# Patient Record
Sex: MALE | Race: BLACK OR AFRICAN AMERICAN | URBAN - METROPOLITAN AREA
[De-identification: names, ages, dates, MRNs, and addresses within clinical notes are randomized per-mention and may not be internally consistent; named-entity substitution may affect disease eponyms.]

---

## 2018-01-27 ENCOUNTER — APPOINTMENT (OUTPATIENT)
Dept: GENERAL RADIOLOGY | Age: 23
DRG: 885 | End: 2018-01-27

## 2018-01-27 ENCOUNTER — HOSPITAL ENCOUNTER (INPATIENT)
Age: 23
LOS: 5 days | Discharge: HOME | DRG: 885 | End: 2018-02-01
Attending: EMERGENCY MEDICINE | Admitting: PSYCHIATRY & NEUROLOGY
Payer: MEDICAID

## 2018-01-27 DIAGNOSIS — F14.10 COCAINE ABUSE (HCC): ICD-10-CM

## 2018-01-27 DIAGNOSIS — T39.1X2A TYLENOL OVERDOSE, INTENTIONAL SELF-HARM, INITIAL ENCOUNTER (HCC): ICD-10-CM

## 2018-01-27 DIAGNOSIS — F32.A DEPRESSION, UNSPECIFIED DEPRESSION TYPE: Primary | ICD-10-CM

## 2018-01-27 DIAGNOSIS — F12.10 MARIJUANA ABUSE: ICD-10-CM

## 2018-01-27 LAB
ACETAMINOPHEN LEVEL: 42 UG/ML (ref 10–30)
ACETAMINOPHEN LEVEL: 63 UG/ML (ref 10–30)
ALBUMIN SERPL-MCNC: 4.1 G/DL (ref 3.9–4.9)
ALBUMIN SERPL-MCNC: 4.8 G/DL (ref 3.9–4.9)
ALP BLD-CCNC: 52 U/L (ref 35–104)
ALP BLD-CCNC: 59 U/L (ref 35–104)
ALT SERPL-CCNC: 10 U/L (ref 0–41)
ALT SERPL-CCNC: 12 U/L (ref 0–41)
AMPHETAMINE SCREEN, URINE: ABNORMAL
ANION GAP SERPL CALCULATED.3IONS-SCNC: 13 MEQ/L (ref 7–13)
ANION GAP SERPL CALCULATED.3IONS-SCNC: 18 MEQ/L (ref 7–13)
AST SERPL-CCNC: 22 U/L (ref 0–40)
AST SERPL-CCNC: 27 U/L (ref 0–40)
BARBITURATE SCREEN URINE: ABNORMAL
BASOPHILS ABSOLUTE: 0.1 K/UL (ref 0–0.2)
BASOPHILS RELATIVE PERCENT: 0.6 %
BENZODIAZEPINE SCREEN, URINE: ABNORMAL
BILIRUB SERPL-MCNC: 0.1 MG/DL (ref 0–1.2)
BILIRUB SERPL-MCNC: 0.2 MG/DL (ref 0–1.2)
BUN BLDV-MCNC: 17 MG/DL (ref 6–20)
BUN BLDV-MCNC: 19 MG/DL (ref 6–20)
CALCIUM SERPL-MCNC: 8.2 MG/DL (ref 8.6–10.2)
CALCIUM SERPL-MCNC: 9 MG/DL (ref 8.6–10.2)
CANNABINOID SCREEN URINE: POSITIVE
CHLORIDE BLD-SCNC: 104 MEQ/L (ref 98–107)
CHLORIDE BLD-SCNC: 104 MEQ/L (ref 98–107)
CK MB: 7.4 NG/ML (ref 0–6.7)
CK MB: 8.5 NG/ML (ref 0–6.7)
CO2: 19 MEQ/L (ref 22–29)
CO2: 21 MEQ/L (ref 22–29)
COCAINE METABOLITE SCREEN URINE: POSITIVE
CREAT SERPL-MCNC: 0.76 MG/DL (ref 0.7–1.2)
CREAT SERPL-MCNC: 0.83 MG/DL (ref 0.7–1.2)
CREATINE KINASE-MB INDEX: 1.3 % (ref 0–3.5)
CREATINE KINASE-MB INDEX: 1.5 % (ref 0–3.5)
EKG ATRIAL RATE: 79 BPM
EKG P AXIS: 42 DEGREES
EKG P-R INTERVAL: 152 MS
EKG Q-T INTERVAL: 372 MS
EKG QRS DURATION: 98 MS
EKG QTC CALCULATION (BAZETT): 426 MS
EKG R AXIS: 85 DEGREES
EKG T AXIS: 54 DEGREES
EKG VENTRICULAR RATE: 79 BPM
EOSINOPHILS ABSOLUTE: 0.1 K/UL (ref 0–0.7)
EOSINOPHILS RELATIVE PERCENT: 0.9 %
ETHANOL PERCENT: 0.1 G/DL
ETHANOL PERCENT: 0.17 G/DL
ETHANOL: 114 MG/DL (ref 0–0.08)
ETHANOL: 189 MG/DL (ref 0–0.08)
GFR AFRICAN AMERICAN: >60
GFR AFRICAN AMERICAN: >60
GFR NON-AFRICAN AMERICAN: >60
GFR NON-AFRICAN AMERICAN: >60
GLOBULIN: 2.1 G/DL (ref 2.3–3.5)
GLOBULIN: 2.7 G/DL (ref 2.3–3.5)
GLUCOSE BLD-MCNC: 81 MG/DL (ref 74–109)
GLUCOSE BLD-MCNC: 83 MG/DL (ref 74–109)
HCT VFR BLD CALC: 47.1 % (ref 42–52)
HEMOGLOBIN: 15.4 G/DL (ref 14–18)
LIPASE: 35 U/L (ref 13–60)
LYMPHOCYTES ABSOLUTE: 3 K/UL (ref 1–4.8)
LYMPHOCYTES RELATIVE PERCENT: 29.2 %
Lab: ABNORMAL
MCH RBC QN AUTO: 28.9 PG (ref 27–31.3)
MCHC RBC AUTO-ENTMCNC: 32.7 % (ref 33–37)
MCV RBC AUTO: 88.1 FL (ref 80–100)
MONOCYTES ABSOLUTE: 0.7 K/UL (ref 0.2–0.8)
MONOCYTES RELATIVE PERCENT: 6.5 %
NEUTROPHILS ABSOLUTE: 6.4 K/UL (ref 1.4–6.5)
NEUTROPHILS RELATIVE PERCENT: 62.8 %
OPIATE SCREEN URINE: ABNORMAL
PDW BLD-RTO: 13.5 % (ref 11.5–14.5)
PHENCYCLIDINE SCREEN URINE: ABNORMAL
PLATELET # BLD: 245 K/UL (ref 130–400)
POTASSIUM SERPL-SCNC: 3.5 MEQ/L (ref 3.5–5.1)
POTASSIUM SERPL-SCNC: 3.8 MEQ/L (ref 3.5–5.1)
RBC # BLD: 5.34 M/UL (ref 4.7–6.1)
SALICYLATE, SERUM: <0.3 MG/DL (ref 15–30)
SALICYLATE, SERUM: <0.3 MG/DL (ref 15–30)
SODIUM BLD-SCNC: 138 MEQ/L (ref 132–144)
SODIUM BLD-SCNC: 141 MEQ/L (ref 132–144)
TOTAL CK: 560 U/L (ref 0–190)
TOTAL CK: 565 U/L (ref 0–190)
TOTAL PROTEIN: 6.2 G/DL (ref 6.4–8.1)
TOTAL PROTEIN: 7.5 G/DL (ref 6.4–8.1)
TSH SERPL DL<=0.05 MIU/L-ACNC: 2.7 UIU/ML (ref 0.27–4.2)
WBC # BLD: 10.1 K/UL (ref 4.8–10.8)

## 2018-01-27 PROCEDURE — 80307 DRUG TEST PRSMV CHEM ANLYZR: CPT

## 2018-01-27 PROCEDURE — 82553 CREATINE MB FRACTION: CPT

## 2018-01-27 PROCEDURE — 6370000000 HC RX 637 (ALT 250 FOR IP): Performed by: PSYCHIATRY & NEUROLOGY

## 2018-01-27 PROCEDURE — 73130 X-RAY EXAM OF HAND: CPT

## 2018-01-27 PROCEDURE — 6360000002 HC RX W HCPCS: Performed by: EMERGENCY MEDICINE

## 2018-01-27 PROCEDURE — 99285 EMERGENCY DEPT VISIT HI MDM: CPT

## 2018-01-27 PROCEDURE — 83690 ASSAY OF LIPASE: CPT

## 2018-01-27 PROCEDURE — 96375 TX/PRO/DX INJ NEW DRUG ADDON: CPT

## 2018-01-27 PROCEDURE — 82550 ASSAY OF CK (CPK): CPT

## 2018-01-27 PROCEDURE — 80053 COMPREHEN METABOLIC PANEL: CPT

## 2018-01-27 PROCEDURE — S0028 INJECTION, FAMOTIDINE, 20 MG: HCPCS | Performed by: EMERGENCY MEDICINE

## 2018-01-27 PROCEDURE — 2500000003 HC RX 250 WO HCPCS: Performed by: EMERGENCY MEDICINE

## 2018-01-27 PROCEDURE — 85025 COMPLETE CBC W/AUTO DIFF WBC: CPT

## 2018-01-27 PROCEDURE — 2580000003 HC RX 258: Performed by: EMERGENCY MEDICINE

## 2018-01-27 PROCEDURE — 1240000000 HC EMOTIONAL WELLNESS R&B

## 2018-01-27 PROCEDURE — G0480 DRUG TEST DEF 1-7 CLASSES: HCPCS

## 2018-01-27 PROCEDURE — 84443 ASSAY THYROID STIM HORMONE: CPT

## 2018-01-27 PROCEDURE — 93005 ELECTROCARDIOGRAM TRACING: CPT

## 2018-01-27 PROCEDURE — 96374 THER/PROPH/DIAG INJ IV PUSH: CPT

## 2018-01-27 PROCEDURE — 36415 COLL VENOUS BLD VENIPUNCTURE: CPT

## 2018-01-27 RX ORDER — NICOTINE 21 MG/24HR
1 PATCH, TRANSDERMAL 24 HOURS TRANSDERMAL DAILY
Status: DISCONTINUED | OUTPATIENT
Start: 2018-01-27 | End: 2018-02-01 | Stop reason: HOSPADM

## 2018-01-27 RX ORDER — ONDANSETRON 2 MG/ML
4 INJECTION INTRAMUSCULAR; INTRAVENOUS ONCE
Status: COMPLETED | OUTPATIENT
Start: 2018-01-27 | End: 2018-01-27

## 2018-01-27 RX ORDER — HALOPERIDOL 5 MG
5 TABLET ORAL EVERY 6 HOURS PRN
Status: DISCONTINUED | OUTPATIENT
Start: 2018-01-27 | End: 2018-02-01 | Stop reason: HOSPADM

## 2018-01-27 RX ORDER — HYDROXYZINE PAMOATE 50 MG/1
50 CAPSULE ORAL EVERY 6 HOURS PRN
Status: DISCONTINUED | OUTPATIENT
Start: 2018-01-27 | End: 2018-02-01 | Stop reason: HOSPADM

## 2018-01-27 RX ORDER — HALOPERIDOL 5 MG/ML
5 INJECTION INTRAMUSCULAR EVERY 6 HOURS PRN
Status: DISCONTINUED | OUTPATIENT
Start: 2018-01-27 | End: 2018-02-01 | Stop reason: HOSPADM

## 2018-01-27 RX ORDER — 0.9 % SODIUM CHLORIDE 0.9 %
1000 INTRAVENOUS SOLUTION INTRAVENOUS ONCE
Status: COMPLETED | OUTPATIENT
Start: 2018-01-27 | End: 2018-01-27

## 2018-01-27 RX ORDER — HYDROXYZINE HYDROCHLORIDE 50 MG/ML
50 INJECTION, SOLUTION INTRAMUSCULAR EVERY 6 HOURS PRN
Status: DISCONTINUED | OUTPATIENT
Start: 2018-01-27 | End: 2018-02-01 | Stop reason: HOSPADM

## 2018-01-27 RX ORDER — ACETAMINOPHEN 325 MG/1
650 TABLET ORAL EVERY 4 HOURS PRN
Status: DISCONTINUED | OUTPATIENT
Start: 2018-01-27 | End: 2018-01-28

## 2018-01-27 RX ADMIN — FAMOTIDINE 20 MG: 10 INJECTION, SOLUTION INTRAVENOUS at 04:41

## 2018-01-27 RX ADMIN — SODIUM CHLORIDE 1000 ML: 9 INJECTION, SOLUTION INTRAVENOUS at 04:39

## 2018-01-27 RX ADMIN — ACETAMINOPHEN 650 MG: 325 TABLET, FILM COATED ORAL at 20:55

## 2018-01-27 RX ADMIN — ONDANSETRON 4 MG: 2 INJECTION INTRAMUSCULAR; INTRAVENOUS at 04:39

## 2018-01-27 ASSESSMENT — ENCOUNTER SYMPTOMS
COUGH: 0
SHORTNESS OF BREATH: 0
VOMITING: 1

## 2018-01-27 ASSESSMENT — PATIENT HEALTH QUESTIONNAIRE - PHQ9
SUM OF ALL RESPONSES TO PHQ QUESTIONS 1-9: 8
SUM OF ALL RESPONSES TO PHQ QUESTIONS 1-9: 15

## 2018-01-27 ASSESSMENT — SLEEP AND FATIGUE QUESTIONNAIRES
RESTFUL SLEEP: YES
AVERAGE NUMBER OF SLEEP HOURS: 7
DIFFICULTY STAYING ASLEEP: YES
DO YOU USE A SLEEP AID: YES
DO YOU HAVE DIFFICULTY SLEEPING: NO
SLEEP PATTERN: DISTURBED/INTERRUPTED SLEEP
DIFFICULTY FALLING ASLEEP: NO
DIFFICULTY ARISING: NO

## 2018-01-27 ASSESSMENT — PAIN SCALES - GENERAL: PAINLEVEL_OUTOF10: 7

## 2018-01-27 NOTE — ED TRIAGE NOTES
Pt arrived via [facility] with [employer] escort for Suicide attempt. Pt told girlfriend tonight that he was going to go out drinking so he could try to kill himself tonight. Pt went to girlfriends house after going to bars and took unknown amount of tylenol and motrin. Girlfriend called [employer]. Pt is pink slipped per [employer]. Pt states he was trying to hurt himself. Pt arrived vomiting. Pt A&Ox4.

## 2018-01-27 NOTE — ED NOTES
Provisional Diagnosis:   Depressive D/O Unspecified   Suicide Attempt    Psychosocial and Contextual Factors:   Pt lives with his girlfriend for over three years, he has two children with them, they are a boy 1 and a girl 1years old. C-SSRS Summary:      Patient: Pt had an over dose of tylenols before arrival to the ER with an increased tylenol level on arrival to the ER  He was intoxicated. Family: Unknown  Agency: None currently  [place] [employer] he was seen at [facility]    Present Suicidal Behavior:      Verbal: Current suicidal thoughts with no current plan    Attempt: Pt took an over dose of pills at home due to stress per pt arguments with his girlfriend    Past Suicidal Behavior:    Verbal: Per pt he had thoughts to hurt himself about a month ago and did not hurt himself    Attempt: None      Self-Injurious/Self-Mutilation: None no H/O self mutilation per pt  Trauma Identified:        Protective Factors:  Pt has a residence to return to and a girlfriend and children in the community he resides with      Risk Factors:  Pt had thoughts less than a month ago and an over dose of tylenols at home before his arrival to the ER. Pt was intoxicated when he took the pills but still has thoughts with no plan currently      Clinical Summary:  Pt came into the ER from home after his girlfriend who he resides with called 911 due to he had taken tylenols to kill himself. He was intoxicated his alcohol level was 0.166 on arrival to the ER and he was positive for THC and Cocaine. He relates his wanting to kill himself was recent arguments with his girlfriend and he has two children. He is layed off from his job and another reason for his anxiety, depression. Pt states he smokes Marijuana a couple times a week and Cocaine usage was  About two weeks ago or longer. Pt has no H/O suicide attempts but for about a month had he has been thinking of killing himself, he was drinking and took tylenols to kill himself.   His acetaminophen level was initially 63 and down currently 42 at 0835. Pt was medically cleared before his arrival to [facility]. He has a flat, sad affect. He has no H/O assault or domestic violence but in residential briefly x 2 one DUI in 2017 and one two weeks ago where he has to pay fines and if not paid possibly would get residential time, no other residential time for anything else per pt. He saw the [facility] when in high school and no other agencies since then, he is on no medications currently. Pt relates stress to his arguments with his girlfriend and being laid off from his job. Level of Care Disposition:  On call psychiatrist will be called after Debbie Washburn assesses the pt for a disposition. [facility] would need to david a bed for the pt on [facility].    Insurance Precertification Authorization:  Pt is indigent with no insurance and [facility] will assess for the pt's disposition.        Mansi Bailey RN  01/27/18 1200

## 2018-01-27 NOTE — ED NOTES
Pt walked to [facility] bed 1 after medically cleared, has [address] and pant on. . Pt agreeable and cooperative. Verbally report given to [facility] staff.      Claudia Anguiano RN  01/27/18 5758

## 2018-01-27 NOTE — ED NOTES
Reviewed with Dr. Isabela Dumont pt's arrival to ER after drinking he took a bottle of tylenols unknown how many and was vomiting on arrival his initial tylenol level was 63 now in low 40's. He drinks alcohol 2-3 x a week is stressed over being layed off, recent illness of his mother and mother-in-law, and a recent 2nd DUI in less than a year with last having to pay fines and if he does not pay then he will do [facility] time. Currently on no medications no W/D S/S when he stops drinking, reviewed with the doctor his DX and his indigent status with [facility] paying for [facility] stay an indigent bed.   Per Dr. Isabela Dumont admit pt to [facility] Depressive D/O Unspecified on indigent status no home medications and Haldol and Vistaril as PRN's, no other medications     Teena Hawkins RN  01/27/18 4204

## 2018-01-27 NOTE — ED NOTES
Per Jovani Sparks at [facility] she is going to consult with her supervisor and let [facility]-ER aware of what his disposition will be.   [facility] supervisor Jessee Sifuentes is consulting with Jovani Sparks for pt's disposition     Ritchie Doss RN  01/27/18 1366

## 2018-01-27 NOTE — ED PROVIDER NOTES
[facility]  eMERGENCY dEPARTMENT eNCOUnter      Pt Name: Cristiana Zuñiga  MRN: [id number]  Armshellengftara 1995  Date of evaluation: 1/27/2018  Provider: Nava Irwin DO    CHIEF COMPLAINT       Chief Complaint   Patient presents with    Suicide Attempt         HISTORY OF PRESENT ILLNESS   (Location/Symptom, Timing/Onset, Context/Setting, Quality, Duration, Modifying Factors, Severity)  Note limiting factors. Cristiana Zuñiga is a 20 y.o. male who presents to the emergency department Via police. They have filled out a pink sheet as this patient is suicidal.  The patient does not give much history, he states that he took anything he could get his hands on tonight, please say ibuprofen and Tylenol were strewn about the room. Patient does admit to drinking alcohol. He is currently vomiting. He denies abdominal pain. He states he did not use any drugs. Nursing Notes were reviewed. REVIEW OF SYSTEMS    (2-9 systems for level 4, 10 or more for level 5)     Review of Systems   Constitutional: Negative for fever. Respiratory: Negative for cough and shortness of breath. Cardiovascular: Negative for leg swelling. Gastrointestinal: Positive for vomiting. Genitourinary: Negative for hematuria. Musculoskeletal: Negative for joint swelling. Skin: Negative for rash. Neurological: Negative for weakness. Psychiatric/Behavioral: Positive for suicidal ideas. All other systems reviewed and are negative. Except as noted above the remainder of the review of systems was reviewed and negative. PAST MEDICAL HISTORY     Past Medical History:   Diagnosis Date    Depression          SURGICAL HISTORY     History reviewed. No pertinent surgical history. CURRENT MEDICATIONS       There are no discharge medications for this patient. ALLERGIES     Review of patient's allergies indicates no known allergies. FAMILY HISTORY     History reviewed. No pertinent family history.        SOCIAL HISTORY Social History     Social History    Marital status: Single     Spouse name: N/A    Number of children: N/A    Years of education: N/A     Social History Main Topics    Smoking status: Current Every Day Smoker     Packs/day: 1.00     Years: 5.00    Smokeless tobacco: Never Used    Alcohol use Yes      Comment: 2-3 x a week    Drug use: Yes     Types: Marijuana    Sexual activity: Not Asked     Other Topics Concern    None     Social History Narrative    None       SCREENINGS   NIH Stroke Scale  NIH Stroke Scale Assessed: NoGlasgow Coma Scale  Eye Opening: Spontaneous  Best Verbal Response: Oriented  Best Motor Response: Obeys commands  Marcelo Coma Scale Score: 15        PHYSICAL EXAM    (up to 7 for level 4, 8 or more for level 5)     ED Triage Vitals   BP Temp Temp src Pulse Resp SpO2 Height Weight   -- -- -- -- -- -- -- --       Physical Exam   Constitutional: He appears well-developed and well-nourished. He appears distressed. Patient in mild distress secondary to current condition. Actively vomiting   HENT:   Head: Normocephalic and atraumatic. Mouth/Throat: Oropharynx is clear and moist.   Eyes: Conjunctivae are normal.   Pupils are equally round and reacting normally. Extraoccular muscles are grossly intact. Neck: Normal range of motion. No tracheal deviation present. Cardiovascular: Normal rate, regular rhythm, normal heart sounds and intact distal pulses. Exam reveals no friction rub. No murmur heard. Patient tachycardic   Pulmonary/Chest: Effort normal and breath sounds normal. No stridor. No respiratory distress. He has no wheezes. He has no rales. He exhibits no tenderness. Abdominal: Soft. He exhibits no distension and no mass. There is no tenderness. There is no rebound and no guarding. Musculoskeletal: Normal range of motion. He exhibits no edema or deformity. Neurological: He is alert. Answering questions appropriately. No gaze deficit. No gait abnormality.  Moving all extremities. Skin: Skin is warm and dry. Psychiatric: His affect is inappropriate. He expresses impulsivity. He expresses suicidal ideation. He is noncommunicative. Nursing note and vitals reviewed. EMERGENCY DEPARTMENT COURSE and DIFFERENTIAL DIAGNOSIS/MDM:   Vitals:    Vitals:    01/29/18 0955 01/29/18 0955 01/29/18 1544 01/29/18 1547   BP: (!) 138/98 (!) 125/93 (!) 133/90 127/87   Pulse: 79 98 81 115   Resp: 20      Temp: 98 °F (36.7 °C)  98 °F (36.7 °C)    TempSrc: Oral Oral     SpO2:  97% 99%    Weight:       Height:           Patient presents to the emergency department with the complaint described above. Vital signs show tachycardia. He is actively vomiting but otherwise no distress. Abdomen soft and nontender. I will do a full metabolic workup, he will get IV fluids, IV Pepcid and IV Zofran, I will be checking alcohol, Tylenol and salicylate levels in addition to urine drug screen. Is here on a pink slip he will need evaluation by behavioral health once medically cleared. Dr. Vincent Smoker assumed care of the patient. At [place] [address] Controlled consulted. The ER physician spoke with Verner Feast the pharmacist at [address] [place] [address],[address] control and she recommends repeat levels of salicylate, Tylenol, CMP and also to do an EKG.     DIAGNOSTIC RESULTS     LABS:  Labs Reviewed   CK - Abnormal; Notable for the following:        Result Value    Total  (*)     All other components within normal limits    Narrative:     CALL  [facility]  [facility] tel. [phone],  acetaminophen results called to and read back by dr Joana Nageotte, 01/27/2018 05:42,  by Aliyah Zhu - Abnormal; Notable for the following:     Acetaminophen Level 63 (*)     All other components within normal limits    Narrative:     [place] [facility]  [facility] tel. [phone],  acetaminophen results called to and read back by dr Joana Nageotte, 01/27/2018 05:42,  by Fadi Antoine - Abnormal; Notable for the following:     Cannabinoid Scrn, Ur POSITIVE (*) COCAINE METABOLITE SCREEN URINE POSITIVE (*)     All other components within normal limits   SALICYLATE LEVEL - Abnormal; Notable for the following:     Salicylate, Serum <7.9 (*)     All other components within normal limits    Narrative:     CALL  [facility]  ED tel. [phone],  acetaminophen results called to and read back by dr Lorenzo Terrazas, 01/27/2018 05:42,  by JASVIR   CBC WITH AUTO DIFFERENTIAL - Abnormal; Notable for the following:     MCHC 32.7 (*)     All other components within normal limits   COMPREHENSIVE METABOLIC PANEL - Abnormal; Notable for the following:     CO2 19 (*)     Anion Gap 18 (*)     All other components within normal limits    Narrative:     CALL  [facility]  ED tel. [phone],  acetaminophen results called to and read back by dr Lorenzo Terrazas, 01/27/2018 05:42,  by [address] - Abnormal; Notable for the following:     CK-MB 7.4 (*)     All other components within normal limits    Narrative:     CALL  [facility]  ED tel. [phone],  acetaminophen results called to and read back by dr Lorenzo Terrazas, 01/27/2018 05:42,  by Hernando Dai   ACETAMINOPHEN LEVEL - Abnormal; Notable for the following:     Acetaminophen Level 42 (*)     All other components within normal limits    Narrative:     CALL  [facility]  ED tel. [phone],  Acetaminophen results called to and read back by Audrey Santos, 01/27/2018  08:35, by EDISON   CK - Abnormal; Notable for the following:      Total  (*)     All other components within normal limits    Narrative:     CALL  [facility]  ED tel. [phone],  Acetaminophen results called to and read back by Audrey Santos, 01/27/2018  08:35, by [address] [place] [address] - Abnormal; Notable for the following:     CO2 21 (*)     Calcium 8.2 (*)     Total Protein 6.2 (*)     Globulin 2.1 (*)     All other components within normal limits    Narrative:     CALL  [facility]  ED tel. [phone],  Acetaminophen results called to and read back by Audrey Santos, 01/27/2018  08:35, by Rhodia Saint SALICYLATE LEVEL - Abnormal; Notable for the following:     Salicylate, Serum <3.0 (*)     All other components within normal limits    Narrative:     Paul Harper  [facility] tel. [phone],  Acetaminophen results called to and read back by Audrey Santos, 01/27/2018  08:35, by Elizabeth Buchanan   CKMB & RELATIVE PERCENT - Abnormal; Notable for the following:     CK-MB 8.5 (*)     All other components within normal limits    Narrative:     CALL  [facility]  [facility] tel. [phone],  Acetaminophen results called to and read back by Audrey Santos, 01/27/2018  08:35, by EDISON   RAPID INFLUENZA A/B ANTIGENS   ETHANOL   TSH WITHOUT REFLEX    Narrative:     Elainanighat Lam tel. [phone],  acetaminophen results called to and read back by dr Swati Moody, 01/27/2018 05:42,  by Cherylene Menghini    Narrative:     Nichole Lam tel. [phone],  acetaminophen results called to and read back by dr Swati Moody, 01/27/2018 05:42,  by [id number] [place] [address], BLOOD   HEMOGLOBIN AND HEMATOCRIT, BLOOD   HEMOGLOBIN AND HEMATOCRIT, BLOOD    Narrative:     Collection has been rescheduled by Anne Marie Muse at 1/29/18 10:09. Reason: q6   per   jose   POCT OCCULT BLOOD STOOL NON CA SCREEN       All other labs were within normal range or not returned as of this dictation. XR HAND RIGHT (MIN 3 VIEWS)   Final Result      NEGATIVE RIGHT HAND. ED Course as of Jan 30 0723   Sat Jan 27, 2018   0543 Acetaminophen level noted to be 63. Patient will need repeat Tylenol level in 2 hours. This will be at 7:45 AM. Pt likely safe based on [place]inda-Golden nomogram but he will require a repeat tylenol level as stated. Otherwise stable in ED Acetaminophen Level: (!!) 63 [TS]   0546 Patient also noted positive cannabinoid and cocaine metabolites, AST/ADL T are within normal limits currently.   Alcohol level elevated at 189  [TS]      ED Course User Index  [TS] Mary Hastings,        EKG: Normal sinus rhythm at 79 bpm, there is an RSR pattern in V1 and V2, normal axis, the QT interval is 372 ms, the QTC is 426 ms, no PVCs. Patient is medically cleared for psychiatric services. CONSULTS:  IP CONSULT TO HOSPITALIST    PROCEDURES:  Unless otherwise noted below, none     Procedures    FINAL IMPRESSION      1. Depression, unspecified depression type    2. Ingestion of nontoxic substance, intentional self-harm, initial encounter ([facility] [address]    3. Cocaine abuse    4. Marijuana abuse          DISPOSITION/PLAN   DISPOSITION Admitted 01/27/2018 02:19:43 PM      PATIENT REFERRED TO:  No follow-up provider specified. DISCHARGE MEDICATIONS:  There are no discharge medications for this patient.          (Please note that portions of this note were completed with a voice recognition program.  Efforts were made to edit the dictations but occasionally words are mis-transcribed.)    Raúl Mack DO (electronically signed)  Attending Emergency Physician         Raúl Mack DO  01/30/18 0725

## 2018-01-27 NOTE — ED NOTES
Pt awaken for updated assessment. Arouses to name and is cooperative, offered food and fluids took ice water but currently refusing food.      Godfrey Garcia RN  01/27/18 7925

## 2018-01-27 NOTE — ED NOTES
Pt's belongings were returned from security and pt left via a W/c for [facility] with [facility] staff and scurity on [facility] pink sheet continued by Dr. Maggy Estrada with April on report to her pt's EKG was completed     Griffin Allan RN  01/27/18 6280

## 2018-01-27 NOTE — ED NOTES
Called [facility] talked with April RN gave a report on pt. Reviewed reason here in ER, labs, medical history, DX:  And an indigent bed from [facility] reviewed all labs, his tylenol level recent stressors include: illness in his mother and mother-in-law, layed off from a job recently and DUI since 7/17 the last being two weeks ago where if he does not pay the fines may have to do [facility] time. Reviewed no H/O self mutilation, no psychosis, no delusions was seen by [place] while in high school saw a counselor for depression none since then. Reviewed drug H/O marijuana and alcohol 2-3 x a week but with 2 DUI in less than a year apart.   Room 387 bed 1131 No. [place], RN  01/27/18 [facility], RN  01/27/18 1012

## 2018-01-28 LAB
HCT VFR BLD CALC: 48.7 % (ref 42–52)
HEMOGLOBIN: 16.1 G/DL (ref 14–18)
RAPID INFLUENZA  B AGN: NEGATIVE
RAPID INFLUENZA A AGN: NEGATIVE

## 2018-01-28 PROCEDURE — 85014 HEMATOCRIT: CPT

## 2018-01-28 PROCEDURE — 36415 COLL VENOUS BLD VENIPUNCTURE: CPT

## 2018-01-28 PROCEDURE — 6370000000 HC RX 637 (ALT 250 FOR IP): Performed by: PSYCHIATRY & NEUROLOGY

## 2018-01-28 PROCEDURE — 85018 HEMOGLOBIN: CPT

## 2018-01-28 PROCEDURE — 86403 PARTICLE AGGLUT ANTBDY SCRN: CPT

## 2018-01-28 PROCEDURE — 1240000000 HC EMOTIONAL WELLNESS R&B

## 2018-01-28 PROCEDURE — 6360000002 HC RX W HCPCS: Performed by: INTERNAL MEDICINE

## 2018-01-28 RX ORDER — FOLIC ACID 1 MG/1
1 TABLET ORAL DAILY
Status: DISCONTINUED | OUTPATIENT
Start: 2018-01-28 | End: 2018-02-01 | Stop reason: HOSPADM

## 2018-01-28 RX ORDER — MULTIVITAMIN WITH FOLIC ACID 400 MCG
1 TABLET ORAL DAILY
Status: DISCONTINUED | OUTPATIENT
Start: 2018-01-28 | End: 2018-02-01 | Stop reason: HOSPADM

## 2018-01-28 RX ORDER — CHLORDIAZEPOXIDE HYDROCHLORIDE 10 MG/1
10 CAPSULE, GELATIN COATED ORAL EVERY 6 HOURS PRN
Status: DISCONTINUED | OUTPATIENT
Start: 2018-01-29 | End: 2018-01-29

## 2018-01-28 RX ORDER — ONDANSETRON 4 MG/1
4 TABLET, ORALLY DISINTEGRATING ORAL EVERY 8 HOURS PRN
Status: DISCONTINUED | OUTPATIENT
Start: 2018-01-28 | End: 2018-01-28

## 2018-01-28 RX ORDER — THIAMINE MONONITRATE (VIT B1) 100 MG
100 TABLET ORAL DAILY
Status: DISCONTINUED | OUTPATIENT
Start: 2018-01-28 | End: 2018-02-01 | Stop reason: HOSPADM

## 2018-01-28 RX ORDER — CHLORDIAZEPOXIDE HYDROCHLORIDE 5 MG/1
5 CAPSULE, GELATIN COATED ORAL EVERY 6 HOURS PRN
Status: DISCONTINUED | OUTPATIENT
Start: 2018-01-28 | End: 2018-01-28

## 2018-01-28 RX ORDER — ONDANSETRON 4 MG/1
4 TABLET, FILM COATED ORAL EVERY 8 HOURS PRN
Status: DISCONTINUED | OUTPATIENT
Start: 2018-01-28 | End: 2018-02-01 | Stop reason: HOSPADM

## 2018-01-28 RX ORDER — CHLORDIAZEPOXIDE HYDROCHLORIDE 5 MG/1
5 CAPSULE, GELATIN COATED ORAL EVERY 6 HOURS PRN
Status: DISCONTINUED | OUTPATIENT
Start: 2018-01-30 | End: 2018-01-29

## 2018-01-28 RX ORDER — CHLORDIAZEPOXIDE HYDROCHLORIDE 25 MG/1
25 CAPSULE, GELATIN COATED ORAL EVERY 6 HOURS PRN
Status: DISCONTINUED | OUTPATIENT
Start: 2018-01-28 | End: 2018-01-29

## 2018-01-28 RX ORDER — SERTRALINE HYDROCHLORIDE 25 MG/1
25 TABLET, FILM COATED ORAL DAILY
Status: DISCONTINUED | OUTPATIENT
Start: 2018-01-28 | End: 2018-01-29

## 2018-01-28 RX ADMIN — Medication 100 MG: at 11:40

## 2018-01-28 RX ADMIN — FOLIC ACID 1 MG: 1 TABLET ORAL at 11:40

## 2018-01-28 RX ADMIN — SERTRALINE HYDROCHLORIDE 25 MG: 25 TABLET ORAL at 11:40

## 2018-01-28 RX ADMIN — THERA TABS 1 TABLET: TAB at 11:40

## 2018-01-28 RX ADMIN — ONDANSETRON 4 MG: 4 TABLET, ORALLY DISINTEGRATING ORAL at 04:35

## 2018-01-28 ASSESSMENT — LIFESTYLE VARIABLES: HISTORY_ALCOHOL_USE: YES

## 2018-01-28 NOTE — CARE COORDINATION
FAMILY COLLATERAL NOTE    Family/Support Name: Cristino Calle #: [phone]  Relationship to Pt[de-identified] girlfriend        Family/Support contact aware of hospitalization: Yes    Presenting Symptoms/Current Concerns:  Patient was in a emotional distressed state due to his drinking daily. Patient was refusing help for his problem. Patient was attempting suicide prior to this hospitalization. Girlfriend called the police and patient was pink slipped to the ER. Top 3 Life Stressors:   Patient has been depressed and suicidal since he was a young person. Per girlfriend, there are no particular stressors in patient's life currently. Background History Relevant to Current Hospitalization:  Girlfriend has been with patient for about 4 years. She has seen patient on an emotional roller coaster for many years. They have two children together and girlfriend is afraid that patient's compromised mental state with negatively impact them. Family Mental Health/Substance Use History:   Patient's mother and father are both alcoholic. Support Network's Goal for Hospitalization:   Patient needs to be in [employer] and drug treatment. Discharge Plan:   Patient will return to his home and follow the recommendations of the treatment team. Patient needs to go to [facility]. Support Network Supportive of Discharge Plan: Yes    Support can confirm Safety of Location and Security of Weapons:   Girlfriend states there are no weapons in the home. Support agreeable to Safeguard and Monitor Medications (including Prescription and OTC): Yes    Identified Barriers to Compliance with Discharge Plan:   None Identified. Recommendations for Support Network:    advised that all prescription and OTC medication be secured so patient does not have access.       LIZETH Lee

## 2018-01-28 NOTE — CARE COORDINATION
Brief Intervention and Referral to Treatment Summary    Patient was provided PHQ-9, AUDIT and DAST Screening:      PHQ-9 Score: 8  AUDIT Score:  25  DAST Score:  0    Patients substance use is considered    Low Risk/Healthy  Risk  Harmful  Dependent x    Patients depression is considered:    Minimal  Mild x  Moderate  Moderately Severe  Severe    Brief Education was provided:    Patient was receptive x  Patient was not receptive      Brief Intervention Is Provided (Only for AUDIT or DAST, there is no brief intervention for Depression)    Patient reports readiness to decrease and/or stop use and a plan was discussed x  Patient denies readiness to decrease and/or stop use and a plan was not discussed      Recommendations/Referrals for Brief and/or Specialized Treatment Provided to Patient  Patient was forthcoming about his problems with alcoholism. He reported readiness to decrease his drinking. Patient was not sure about the next steps as he has just come to the realization that he has a problem.      Selene BARAHONA

## 2018-01-28 NOTE — H&P
PSYCHIATRIC EVALUATION      CHIEF COMPLAINT:  Depression, suicidal ideations and attempt    HISTORY OF PRESENT ILLNESS: Bang Cerda  is a 20 y.o. male with no clear previous history of treatment for mental illness, who was admitted from the ER due to depressed mood, and suicidal ideations/suicide attempt. When interviewed today, the patient reported that he had a suicide attempt \"the other day\" when he took an overdose of Tylenol and Motrin when intoxicated with alcohol, after an argument with his girlfriend. He said he had been feeling depressed for \"quite some time\"; he has multiple stressors: he is currently laid off, his mother and his girlfriend's mother are very ill, and he feels overwhelmed. He said he had suicidal ideations \"for some time\", but never acted on them, until the other night, when he was intoxicated. He said his sleep was \"not good last night\", but was [employer]". He said his appetite was \"OK\". He denied having any homicidal ideations. He denied having any hallucinations, paranoia or delusions. PAST PSYCHIATRIC HISTORY: denied; however ER note mentions that he had been seen at the [facility] in high school    PAST MEDICAL HISTORY:       Diagnosis Date    Depression          ALLERGIES: Review of patient's allergies indicates no known allergies. FAMILY PSYCHIATRIC HISTORY: denies    SOCIAL HISTORY: the patient was born in [place] and raised in [place] and [place]. He has an 11th grade education; has no GED. He worked in the past in [email or web address], and more recently [employer]. He is single, has a girlfriend, and 2 children. He lives with his girlfriend and his children. SUBSTANCE ABUSE HISTORY: he smokes half a pack/day. He he drinks alcohol a few times/week (used to drink daily). He smokes THC. His tox screen was positive for THC and cocaine. He has had 2 DUI's. HE has not been in chemical dependency treatment.      VITALS: /73   Pulse 93   Temp 99 °F (37.2 °C)   Resp 16 01/27/2018 59  35 - 104 U/L Final    ALT 01/27/2018 12  0 - 41 U/L Final    AST 01/27/2018 27  0 - 40 U/L Final    Comment: Specimen hemolysis has exceeded the interference as defined  by Roche. Value may be falsely increased. Suggest  recollection if clinically indicated.  Globulin 01/27/2018 2.7  2.3 - 3.5 g/dL Final    Lipase 01/27/2018 35  13 - 60 U/L Final    CK-MB 01/27/2018 7.4* 0.0 - 6.7 ng/mL Final    CK-MB Index 01/27/2018 1.3  0.0 - 3.5 % Final    Ventricular Rate 01/27/2018 79  BPM Preliminary    Atrial Rate 01/27/2018 79  BPM Preliminary    P-R Interval 01/27/2018 152  ms Preliminary    QRS Duration 01/27/2018 98  ms Preliminary    Q-T Interval 01/27/2018 372  ms Preliminary    QTc Calculation (Bazett) 01/27/2018 426  ms Preliminary    P Axis 01/27/2018 42  degrees Preliminary    R Axis 01/27/2018 85  degrees Preliminary    T Axis 01/27/2018 54  degrees Preliminary    Acetaminophen Level 01/27/2018 42* 10 - 30 ug/mL Final    Total CK 01/27/2018 565* 0 - 190 U/L Final    Sodium 01/27/2018 138  132 - 144 mEq/L Final    Potassium 01/27/2018 3.8  3.5 - 5.1 mEq/L Final    Chloride 01/27/2018 104  98 - 107 mEq/L Final    CO2 01/27/2018 21* 22 - 29 mEq/L Final    Anion Gap 01/27/2018 13  7 - 13 mEq/L Final    Glucose 01/27/2018 81  74 - 109 mg/dL Final    BUN 01/27/2018 17  6 - 20 mg/dL Final    CREATININE 01/27/2018 0.76  0.70 - 1.20 mg/dL Final    GFR Non- 01/27/2018 >60.0  >60 Final    Comment: >60 mL/min/1.73m2 EGFR, calc. for ages 25 and older using the  MDRD formula (not corrected for weight), is valid for stable  renal function.  GFR  01/27/2018 >60.0  >60 Final    Comment: >60 mL/min/1.73m2 EGFR, calc. for ages 25 and older using the  MDRD formula (not corrected for weight), is valid for stable  renal function.       Calcium 01/27/2018 8.2* 8.6 - 10.2 mg/dL Final    Total Protein 01/27/2018 6.2* 6.4 - 8.1 g/dL Final    Alb 01/27/2018 4.1  3.9 - 4.9 g/dL Final    Total Bilirubin 01/27/2018 0.1  0.0 - 1.2 mg/dL Final    Alkaline Phosphatase 01/27/2018 52  35 - 104 U/L Final    ALT 01/27/2018 10  0 - 41 U/L Final    AST 01/27/2018 22  0 - 40 U/L Final    Globulin 01/27/2018 2.1* 2.3 - 3.5 g/dL Final    Ethanol Lvl 01/27/2018 114  mg/dL Final    Ethanol percent 01/27/2018 0.100  G/dL Final    Salicylate, Serum 17/99/4363 <0.3* 15.0 - 30.0 mg/dL Final    Comment: Anti-pyretic:  3.0-10.0 mg/dL  Anti-inflammatory:  15.0-30.0 mg/dL  Toxic: >30.0 mg/dL      CK-MB 01/27/2018 8.5* 0.0 - 6.7 ng/mL Final    CK-MB Index 01/27/2018 1.5  0.0 - 3.5 % Final    Rapid Influenza A Ag 01/28/2018 Negative  Negative Final    Rapid Influenza B Ag 01/28/2018 Negative  Negative Final    Hemoglobin 01/28/2018 16.1  14.0 - 18.0 g/dL Final    Hematocrit 01/28/2018 48.7  42.0 - 52.0 % Final           MEDICATIONS: Current Facility-Administered Medications: chlordiazePOXIDE (LIBRIUM) capsule 25 mg, 25 mg, Oral, Q6H PRN  [START ON 1/29/2018] chlordiazePOXIDE (LIBRIUM) capsule 10 mg, 10 mg, Oral, Q6H PRN  vitamin B-1 (THIAMINE) tablet 100 mg, 100 mg, Oral, Daily  folic acid (FOLVITE) tablet 1 mg, 1 mg, Oral, Daily  multivitamin 1 tablet, 1 tablet, Oral, Daily  sertraline (ZOLOFT) tablet 25 mg, 25 mg, Oral, Daily  [START ON 1/30/2018] chlordiazePOXIDE (LIBRIUM) capsule 5 mg, 5 mg, Oral, Q6H PRN  ondansetron (ZOFRAN) tablet 4 mg, 4 mg, Oral, Q8H PRN  magnesium hydroxide (MILK OF MAGNESIA) 400 MG/5ML suspension 30 mL, 30 mL, Oral, Daily PRN  nicotine (NICODERM CQ) 21 MG/24HR 1 patch, 1 patch, Transdermal, Daily  haloperidol lactate (HALDOL) injection 5 mg, 5 mg, Intramuscular, Q6H PRN **OR** haloperidol (HALDOL) tablet 5 mg, 5 mg, Oral, Q6H PRN  hydrOXYzine (VISTARIL) capsule 50 mg, 50 mg, Oral, Q6H PRN **OR** hydrOXYzine (VISTARIL) injection 50 mg, 50 mg, Intramuscular, Q6H PRN     ASSESSMENT:     Major depressive disorder, severe, without psychotic symptoms.   Alcohol use disorder  Cannabis use disorder  Stimulant use disorder (cocaine)    PLAN: Patient admitted to [facility] for further evaluation, treatment and safety. Daily vitals, regular diet provided. Patient will be started on an alcohol withdrawal protocol and Zoloft for depression. PRN medications for agitation, anxiety and sleep are in place. Patient will be encouraged to participate in individual, group and milieu therapy. Patient will be discharged when clinically stable. Please note that case has been discussed with treatment team and notes have been reviewed.        Signed:  Keyur Pacheco  1/28/2018  4:49 PM

## 2018-01-28 NOTE — PROGRESS NOTES
Refused breakfast Temp 100.1 Had some emesis earlier no other GI symptoms No URI symptoms per Shannan Farrell hospitalist monitor Dr SAMY hernandez aware and is going to order CIWA Protocol

## 2018-01-29 PROBLEM — F14.90 COCAINE USE: Status: ACTIVE | Noted: 2018-01-29

## 2018-01-29 PROBLEM — F32.A DEPRESSION: Status: RESOLVED | Noted: 2018-01-27 | Resolved: 2018-01-29

## 2018-01-29 PROBLEM — F32.2 SEVERE SINGLE CURRENT EPISODE OF MAJOR DEPRESSIVE DISORDER, WITHOUT PSYCHOTIC FEATURES (HCC): Status: ACTIVE | Noted: 2018-01-29

## 2018-01-29 LAB
HCT VFR BLD CALC: 48.8 % (ref 42–52)
HCT VFR BLD CALC: 50.2 % (ref 42–52)
HCT VFR BLD CALC: 51.1 % (ref 42–52)
HEMOGLOBIN: 16.3 G/DL (ref 14–18)
HEMOGLOBIN: 16.7 G/DL (ref 14–18)
HEMOGLOBIN: 17.3 G/DL (ref 14–18)

## 2018-01-29 PROCEDURE — 85014 HEMATOCRIT: CPT

## 2018-01-29 PROCEDURE — 1240000000 HC EMOTIONAL WELLNESS R&B

## 2018-01-29 PROCEDURE — 99232 SBSQ HOSP IP/OBS MODERATE 35: CPT | Performed by: PSYCHIATRY & NEUROLOGY

## 2018-01-29 PROCEDURE — 36415 COLL VENOUS BLD VENIPUNCTURE: CPT

## 2018-01-29 PROCEDURE — 85018 HEMOGLOBIN: CPT

## 2018-01-29 PROCEDURE — 6370000000 HC RX 637 (ALT 250 FOR IP): Performed by: PSYCHIATRY & NEUROLOGY

## 2018-01-29 PROCEDURE — 6370000000 HC RX 637 (ALT 250 FOR IP): Performed by: NURSE PRACTITIONER

## 2018-01-29 PROCEDURE — 93010 ELECTROCARDIOGRAM REPORT: CPT | Performed by: INTERNAL MEDICINE

## 2018-01-29 PROCEDURE — 6360000002 HC RX W HCPCS: Performed by: PHYSICIAN ASSISTANT

## 2018-01-29 RX ORDER — ACAMPROSATE CALCIUM 333 MG/1
666 TABLET, DELAYED RELEASE ORAL 3 TIMES DAILY
Status: DISCONTINUED | OUTPATIENT
Start: 2018-01-29 | End: 2018-02-01 | Stop reason: HOSPADM

## 2018-01-29 RX ORDER — IBUPROFEN 600 MG/1
600 TABLET ORAL ONCE
Status: COMPLETED | OUTPATIENT
Start: 2018-01-29 | End: 2018-01-29

## 2018-01-29 RX ADMIN — ONDANSETRON HYDROCHLORIDE 4 MG: 4 TABLET, FILM COATED ORAL at 13:19

## 2018-01-29 RX ADMIN — ACAMPROSATE CALCIUM 666 MG: 333 TABLET, DELAYED RELEASE ORAL at 13:50

## 2018-01-29 RX ADMIN — FOLIC ACID 1 MG: 1 TABLET ORAL at 10:22

## 2018-01-29 RX ADMIN — ACAMPROSATE CALCIUM 666 MG: 333 TABLET, DELAYED RELEASE ORAL at 20:58

## 2018-01-29 RX ADMIN — Medication 100 MG: at 10:22

## 2018-01-29 RX ADMIN — THERA TABS 1 TABLET: TAB at 10:22

## 2018-01-29 RX ADMIN — IBUPROFEN 600 MG: 600 TABLET, FILM COATED ORAL at 10:59

## 2018-01-29 RX ADMIN — SERTRALINE HYDROCHLORIDE 25 MG: 25 TABLET ORAL at 10:22

## 2018-01-29 ASSESSMENT — PAIN SCALES - GENERAL: PAINLEVEL_OUTOF10: 3

## 2018-01-29 NOTE — PROGRESS NOTES
Patient denies SI, HI, and AVH. Patient minimizing depression/anxiety; denies depression and anxiety at time of assessment. Patient main focus on feeling ill. Patient has been isolating to room. Reports feeling nauseas but denies any emesis today. Patient has been drinking fluids, but has poor appetite. Patient did leave room to visit with family that had come for visiting hours. Polite and cooperative with staff.  Electronically signed by Reyna Mayer LPN on 9/85/6105 at 8:94 PM

## 2018-01-29 NOTE — PROGRESS NOTES
Group Therapy Note    Date: 1/29/2018  Start Time: 1440  End Time: 4331    Number of Participants: 6    Type of Group: Psychoeducation    Patient's Goal:  To participate in mood management group. Notes:  Patient learned about the cycle of low self-esteem. Status After Intervention:  Improved    Participation Level: Active Listener    Participation Quality: Appropriate      Speech:  normal      Thought Process/Content: Logical      Affective Functioning: Congruent      Mood: elevated      Level of consciousness:  Alert      Response to Learning: Able to verbalize current knowledge/experience      Endings: None Reported    Modes of Intervention: Education      Discipline Responsible: /Counselor      Signature:   LIZETH Minor

## 2018-01-29 NOTE — PROGRESS NOTES
BEHAVIORAL HEALTH FOLLOW-UP NOTE     1/29/2018     Patient was seen and examined in person, Chart reviewed   Patient's case discussed with staff/team    Chief Complaint: depression, SA    Interim History:     Has been using alcohol every other day - 6 beer and 2 shot   Not going through withdrawal   Mom and his GF mom doing well  Mom has MS - struggling with her problem  Pt report that he is starting to feel better  Not sure why he feel better  Family is supportive. relationship with GF is better    Appetite:   [] Normal/Unchanged  [] Increased  [x] Decreased      Sleep:       [] Normal/Unchanged  [x] Fair       [] Poor              Energy:    [] Normal/Unchanged  [] Increased  [x] Decreased        SI [] Present  [x] Absent    HI  []Present  [x] Absent     Aggression:  [] yes  [x] no    Patient is [x] able  [] unable to CONTRACT FOR SAFETY     PAST MEDICAL/PSYCHIATRIC HISTORY:   Past Medical History:   Diagnosis Date    Depression        FAMILY/SOCIAL HISTORY:  History reviewed. No pertinent family history. Social History     Social History    Marital status: Single     Spouse name: N/A    Number of children: N/A    Years of education: N/A     Occupational History    Not on file. Social History Main Topics    Smoking status: Current Every Day Smoker     Packs/day: 1.00     Years: 5.00    Smokeless tobacco: Never Used    Alcohol use Yes      Comment: 2-3 x a week    Drug use: Yes     Types: Marijuana    Sexual activity: Not on file     Other Topics Concern    Not on file     Social History Narrative    No narrative on file           ROS:  [x] All negative/unchanged except if checked.  Explain positive(checked items) below:  [] Constitutional  [] Eyes  [] Ear/Nose/Mouth/Throat  [] Respiratory  [] CV  [] GI  []   [] Musculoskeletal  [] Skin/Breast  [] Neurological  [] Endocrine  [] Heme/Lymph  [] Allergic/Immunologic    Explanation:     MEDICATIONS:    Current Facility-Administered Medications:    chlordiazePOXIDE (LIBRIUM) capsule 25 mg, 25 mg, Oral, Q6H PRN, Christiana Jimenez MD    chlordiazePOXIDE (LIBRIUM) capsule 10 mg, 10 mg, Oral, Q6H PRN, Christiana Jimenez MD    vitamin B-1 (THIAMINE) tablet 100 mg, 100 mg, Oral, Daily, Christiana Jimenez MD, 100 mg at 30/22/61 0345    folic acid (FOLVITE) tablet 1 mg, 1 mg, Oral, Daily, Christiana Jimenez MD, 1 mg at 01/29/18 1022    multivitamin 1 tablet, 1 tablet, Oral, Daily, Christiana Jimenez MD, 1 tablet at 01/29/18 1022    sertraline (ZOLOFT) tablet 25 mg, 25 mg, Oral, Daily, Christiana Jimenez MD, 25 mg at 01/29/18 1022    [START ON 1/30/2018] chlordiazePOXIDE (LIBRIUM) capsule 5 mg, 5 mg, Oral, Q6H PRN, Christiana Jimenez MD    ondansetron [facility]) tablet 4 mg, 4 mg, Oral, Q8H PRN, Saba Ortiz PA-C    magnesium hydroxide (MILK OF MAGNESIA) 400 MG/5ML suspension 30 mL, 30 mL, Oral, Daily PRN, Christiana Jimenez MD    nicotine (NICODERM CQ) 21 MG/24HR 1 patch, 1 patch, Transdermal, Daily, Christiana Jimenez MD, 1 patch at 01/29/18 1022    haloperidol lactate (HALDOL) injection 5 mg, 5 mg, Intramuscular, Q6H PRN **OR** haloperidol (HALDOL) tablet 5 mg, 5 mg, Oral, Q6H PRN, Christiana Jimenez MD    hydrOXYzine (VISTARIL) capsule 50 mg, 50 mg, Oral, Q6H PRN **OR** hydrOXYzine (VISTARIL) injection 50 mg, 50 mg, Intramuscular, Q6H PRN, Christiana Jimenez MD      Examination:  BP (!) 125/93   Pulse 98   Temp 98 °F (36.7 °C) (Oral)   Resp 20   Ht 6' (1.829 m)   Wt 170 lb (77.1 kg)   SpO2 97%   BMI 23.06 kg/m²   Gait - steady  Medication side effects(SE): no    Mental Status Examination:    Level of consciousness:  within normal limits   Appearance:  fair grooming and fair hygiene  Behavior/Motor:  psychomotor retardation  Attitude toward examiner:  cooperative  Speech:  slow   Mood: depressed  Affect:  anxious  Thought processes:  coherent   Thought content:  Suicidal Ideation:  passive  Delusions:  no evidence of delusions  Perceptual Disturbance:  denies any perceptual

## 2018-01-29 NOTE — PROGRESS NOTES
Pt. refused to attend the 0900 community meeting due to resting in room, despite staff encouragement.  Electronically signed by Devorah Jasso on 1/29/2018 at 9:46 AM

## 2018-01-29 NOTE — PLAN OF CARE
Problem: Altered Mood, Depressive Behavior  Goal: LTG-Able to verbalize acceptance of life and situations over which he or she has no control  Outcome: Ongoing    Goal: STG-Knowledge of positive coping patterns  Outcome: Ongoing      Problem: General Medical Problem-Behavioral Health  Goal: LTG-Able to verbalize understanding of medical condition and treatments  Outcome: Ongoing    Goal: STG-Participation in care planning  Outcome: Ongoing

## 2018-01-30 PROCEDURE — 99232 SBSQ HOSP IP/OBS MODERATE 35: CPT | Performed by: PSYCHIATRY & NEUROLOGY

## 2018-01-30 PROCEDURE — 6370000000 HC RX 637 (ALT 250 FOR IP): Performed by: PSYCHIATRY & NEUROLOGY

## 2018-01-30 PROCEDURE — 6360000002 HC RX W HCPCS: Performed by: PHYSICIAN ASSISTANT

## 2018-01-30 PROCEDURE — 1240000000 HC EMOTIONAL WELLNESS R&B

## 2018-01-30 RX ORDER — IBUPROFEN 400 MG/1
400 TABLET ORAL EVERY 6 HOURS PRN
Status: DISCONTINUED | OUTPATIENT
Start: 2018-01-30 | End: 2018-02-01 | Stop reason: HOSPADM

## 2018-01-30 RX ADMIN — ACAMPROSATE CALCIUM 666 MG: 333 TABLET, DELAYED RELEASE ORAL at 14:21

## 2018-01-30 RX ADMIN — ONDANSETRON HYDROCHLORIDE 4 MG: 4 TABLET, FILM COATED ORAL at 10:13

## 2018-01-30 RX ADMIN — Medication 100 MG: at 08:29

## 2018-01-30 RX ADMIN — ACAMPROSATE CALCIUM 666 MG: 333 TABLET, DELAYED RELEASE ORAL at 21:17

## 2018-01-30 RX ADMIN — FOLIC ACID 1 MG: 1 TABLET ORAL at 08:30

## 2018-01-30 RX ADMIN — SERTRALINE HYDROCHLORIDE 50 MG: 50 TABLET ORAL at 08:30

## 2018-01-30 RX ADMIN — ACAMPROSATE CALCIUM 666 MG: 333 TABLET, DELAYED RELEASE ORAL at 08:30

## 2018-01-30 RX ADMIN — THERA TABS 1 TABLET: TAB at 08:30

## 2018-01-30 RX ADMIN — IBUPROFEN 400 MG: 400 TABLET, FILM COATED ORAL at 16:53

## 2018-01-30 ASSESSMENT — PAIN DESCRIPTION - LOCATION: LOCATION: HEAD

## 2018-01-30 ASSESSMENT — PAIN SCALES - GENERAL
PAINLEVEL_OUTOF10: 5
PAINLEVEL_OUTOF10: 5

## 2018-01-30 NOTE — DISCHARGE INSTR - DIET

## 2018-01-30 NOTE — PROGRESS NOTES
Pt was nauseated and stated he vomited after breakfast . He requested and was given zofran 4 mg po and then he rested in bed for a long interval

## 2018-01-30 NOTE — PROGRESS NOTES
Pt. refused to attend the 1100 skills group due to resting in room, despite staff encouragement.  Electronically signed by Anderson Jones on 1/30/2018 at 12:32 PM

## 2018-01-30 NOTE — PROGRESS NOTES
tablet 666 mg, 666 mg, Oral, TID, Arnaldo Galindo MD, 666 mg at 01/30/18 0830    vitamin B-1 (THIAMINE) tablet 100 mg, 100 mg, Oral, Daily, Marine Izquierdo MD, 100 mg at 51/01/45 3425    folic acid (FOLVITE) tablet 1 mg, 1 mg, Oral, Daily, Marine Izquierdo MD, 1 mg at 01/30/18 0830    multivitamin 1 tablet, 1 tablet, Oral, Daily, Marine Izquierdo MD, 1 tablet at 01/30/18 0830    ondansetron (ZOFRAN) tablet 4 mg, 4 mg, Oral, Q8H PRN, Stiven Garcia PA-C, 4 mg at 01/30/18 1013    magnesium hydroxide (MILK OF MAGNESIA) 400 MG/5ML suspension 30 mL, 30 mL, Oral, Daily PRN, Marine Izquierdo MD    nicotine (NICODERM CQ) 21 MG/24HR 1 patch, 1 patch, Transdermal, Daily, Marine Izquierdo MD, 1 patch at 01/30/18 5270    haloperidol lactate (HALDOL) injection 5 mg, 5 mg, Intramuscular, Q6H PRN **OR** haloperidol (HALDOL) tablet 5 mg, 5 mg, Oral, Q6H PRN, Marine Izquierdo MD    hydrOXYzine (VISTARIL) capsule 50 mg, 50 mg, Oral, Q6H PRN **OR** hydrOXYzine (VISTARIL) injection 50 mg, 50 mg, Intramuscular, Q6H PRN, Marine Izquierdo MD      Examination:  BP (!) 146/97   Pulse 103   Temp 98 °F (36.7 °C) (Oral)   Resp 20   Ht 6' (1.829 m)   Wt 170 lb (77.1 kg)   SpO2 98%   BMI 23.06 kg/m²   Gait - steady  Medication side effects(SE): no    Mental Status Examination:    Level of consciousness:  within normal limits   Appearance:  fair grooming and fair hygiene  Behavior/Motor:  psychomotor retardation  Attitude toward examiner:  cooperative  Speech:  slow   Mood: depressed  Affect:  anxious  Thought processes:  coherent   Thought content:  Suicidal Ideation:  passive  Delusions:  no evidence of delusions  Perceptual Disturbance:  denies any perceptual disturbance  Cognition:  oriented to person, place, and time   Concentration distractible  Insight fair   Judgement fair     ASSESSMENT:   Patient symptoms are:  [] Well controlled  [x] Improving  [] Worsening  [] No change      Diagnosis:   Active Problems:    Severe single

## 2018-01-31 PROCEDURE — 1240000000 HC EMOTIONAL WELLNESS R&B

## 2018-01-31 PROCEDURE — 6370000000 HC RX 637 (ALT 250 FOR IP): Performed by: PSYCHIATRY & NEUROLOGY

## 2018-01-31 PROCEDURE — 90833 PSYTX W PT W E/M 30 MIN: CPT | Performed by: PSYCHIATRY & NEUROLOGY

## 2018-01-31 PROCEDURE — 99231 SBSQ HOSP IP/OBS SF/LOW 25: CPT | Performed by: PSYCHIATRY & NEUROLOGY

## 2018-01-31 RX ADMIN — ACAMPROSATE CALCIUM 666 MG: 333 TABLET, DELAYED RELEASE ORAL at 13:53

## 2018-01-31 RX ADMIN — ACAMPROSATE CALCIUM 666 MG: 333 TABLET, DELAYED RELEASE ORAL at 08:54

## 2018-01-31 RX ADMIN — ACAMPROSATE CALCIUM 666 MG: 333 TABLET, DELAYED RELEASE ORAL at 20:09

## 2018-01-31 RX ADMIN — THERA TABS 1 TABLET: TAB at 08:54

## 2018-01-31 RX ADMIN — SERTRALINE HYDROCHLORIDE 50 MG: 50 TABLET ORAL at 08:54

## 2018-01-31 RX ADMIN — FOLIC ACID 1 MG: 1 TABLET ORAL at 08:55

## 2018-01-31 ASSESSMENT — ENCOUNTER SYMPTOMS
SHORTNESS OF BREATH: 0
COUGH: 0
NAUSEA: 1
VOMITING: 1

## 2018-01-31 NOTE — H&P
of major depressive disorder, without psychotic features ([facility] [address]    Cocaine use    [facility]  Suicide attempt  NV  Black stool r/o GI hemorrhage    Guaiac all stool  H/H q6 x 4  gastroccult all emesis  Prn antiemetic    DVT Prophylaxis:Encourage ambulation, low risk for DVT, no chemical or mechanical prophylaxis necessary  c  This is an H&P exam only.   Pt has been advised to follow up with PCP for abnormal labs and/or medical concerns    Electronically signed by Sandy Dunn PA-C on 1/31/2018 at 1:53 AM  Dr Carlos Jaramillo MD, Supervising Physician

## 2018-01-31 NOTE — PROGRESS NOTES
Pt. attended the 0900 community meeting.  Electronically signed by Reinier Mendez on 1/31/2018 at 10:55 AM

## 2018-01-31 NOTE — PROGRESS NOTES
Pt. refused to attend the 1100 skills group, despite staff encouragement. Electronically signed by Denise Lui, [address] on 1/31/2018 at 1:41 PM

## 2018-02-01 VITALS
SYSTOLIC BLOOD PRESSURE: 128 MMHG | OXYGEN SATURATION: 99 % | TEMPERATURE: 98 F | RESPIRATION RATE: 18 BRPM | HEIGHT: 72 IN | BODY MASS INDEX: 23.03 KG/M2 | HEART RATE: 65 BPM | DIASTOLIC BLOOD PRESSURE: 85 MMHG | WEIGHT: 170 LBS

## 2018-02-01 PROCEDURE — 99239 HOSP IP/OBS DSCHRG MGMT >30: CPT | Performed by: PSYCHIATRY & NEUROLOGY

## 2018-02-01 PROCEDURE — 6370000000 HC RX 637 (ALT 250 FOR IP): Performed by: PSYCHIATRY & NEUROLOGY

## 2018-02-01 RX ORDER — ACAMPROSATE CALCIUM 333 MG/1
666 TABLET, DELAYED RELEASE ORAL 3 TIMES DAILY
Qty: 90 TABLET | Refills: 1 | Status: SHIPPED | OUTPATIENT
Start: 2018-02-01

## 2018-02-01 RX ORDER — ACAMPROSATE CALCIUM 333 MG/1
666 TABLET, DELAYED RELEASE ORAL 3 TIMES DAILY
Qty: 84 TABLET | Refills: 0 | Status: SHIPPED | OUTPATIENT
Start: 2018-02-01 | End: 2018-02-01

## 2018-02-01 RX ADMIN — ACAMPROSATE CALCIUM 666 MG: 333 TABLET, DELAYED RELEASE ORAL at 08:17

## 2018-02-01 RX ADMIN — SERTRALINE HYDROCHLORIDE 50 MG: 50 TABLET ORAL at 08:17

## 2018-02-01 RX ADMIN — THERA TABS 1 TABLET: TAB at 08:17

## 2018-02-01 RX ADMIN — Medication 100 MG: at 08:17

## 2018-02-01 RX ADMIN — FOLIC ACID 1 MG: 1 TABLET ORAL at 08:17

## 2018-02-01 NOTE — DISCHARGE SUMMARY
Pt given all discharge information and instructions and able to verbalize an understanding of same. Pt denies any suicidal or homicidal ideations or hallucinations. Home belongings returned and food/drug interaction guide given. Printed rx given to patient and sent home with 2wks of zoloft and 6 days of campral.  Pharmacy ordering balance of campral and will send to [facility]WT when arrives, call patient to  when arrives. Sophia, arrived to take patient home.       Electronically signed by Stella Ellison RN on 2/1/2018 at 1:36 PM

## 2018-02-01 NOTE — PROGRESS NOTES
Out on unit, social. Denies SI, depression or hallucinations. Looks and sounds better. Calm cooperative, eating, sleeping and looking forward to DC.

## 2018-02-01 NOTE — DISCHARGE SUMMARY
participate in group and other milieu activity  Patient started to feel better with this combination of treatment. Significant progress in the symptoms since admission. Mood better, with the score of 2/10 - bad  No AVH or paranoid thoughts  No Hopeless or worthless feeling  No active SI/HI  Appetite:  [x] Normal  [] Increased  [] Decreased    Sleep:       [x] Normal  [] Fair       [] Poor            Energy:    [x] Normal  [] Increased  [] Decreased     SI [] Present  [x] Absent  HI  []Present  [x] Absent   Aggression:  [] yes  [] no  Patient is [x] able  [] unable to CONTRACT FOR SAFETY   Medication side effects(SE):  [x] None(Psych. Meds.) [] Other      Mental Status Examination on discharge:    Level of consciousness:  within normal limits   Appearance:  well-appearing  Behavior/Motor:  no abnormalities noted  Attitude toward examiner:  attentive and good eye contact  Speech:  spontaneous, normal rate and normal volume   Mood: euthymic  Affect:  mood congruent  Thought processes:  linear   Thought content:  Suicidal Ideation:  denies suicidal ideation  Delusions:  no evidence of delusions  Perceptual Disturbance:  denies any perceptual disturbance  Cognition:  oriented to person, place, and time   Concentration intact  Memory intact  Insight good   Judgement fair   Fund of Knowledge adequate      ASSESSMENT:  Patient symptoms are:  [] Well controlled  [x] Improving  [] Worsening  [] No change      Diagnosis:  Active Problems:    Severe single current episode of major depressive disorder, without psychotic features (HCC)    Cocaine use    Alcohol use disorder ([facility] [address]      LABS:    Recent Labs      01/29/18   1139   HGB  17.3     No results for input(s): NA, K, CL, CO2, BUN, CREATININE, GLUCOSE in the last 72 hours. No results for input(s): BILITOT, ALKPHOS, AST, ALT in the last 72 hours.   Lab Results   Component Value Date    LABAMPH Neg 01/27/2018    BARBSCNU Neg 01/27/2018    LABBENZ Neg 01/27/2018 OPIATESCREENURINE Neg 01/27/2018    PHENCYCLIDINESCREENURINE Neg 01/27/2018    ETOH 114 01/27/2018     Lab Results   Component Value Date    TSH 2.700 01/27/2018     No results found for: LITHIUM  No results found for: VALPROATE, CBMZ    RISK ASSESSMENT AT DISCHARGE: Low risk for suicide and homicide. Treatment Plan:  Reviewed current Medications with the patient. Education provided on the complaince with treatment. Risks, benefits, side effects, drug-to-drug interactions and alternatives to treatment were discussed. Encourage patient to attend outpatient follow up appointment and therapy. Discharge planning discussed with the patient including follow up plan as documented in the follow up plan section. Patient expressed understanding of the condition and treatment plan.   F/U with [facility]       Medication List      START taking these medications    acamprosate 333 MG tablet  Commonly known as:  CAMPRAL  Take 2 tablets by mouth 3 times daily     sertraline 50 MG tablet  Commonly known as:  ZOLOFT  Take 1 tablet by mouth daily  Start taking on:  2/2/2018           Where to Get Your Medications      You can get these medications from any pharmacy    Bring a paper prescription for each of these medications  · acamprosate 333 MG tablet  · sertraline 50 MG tablet         TIME SPEND - 35 MINUTES TO COMPLETE THE EVALUATION, DISCHARGE SUMMARY, MEDICATION RECONCILIATION AND FOLLOW UP CARE     Jay Jade  2/1/2018  9:30 AM